# Patient Record
(demographics unavailable — no encounter records)

---

## 2025-01-05 NOTE — END OF VISIT
[] : Fellow [FreeTextEntry3] : Pt has extensive but small volume glendy disease and no other findings attributable to NHL. Reasonable to defer therapy at this time and repeat imaging in May as above. [id number] with pt that there is no survival benefit associated with early therapy, that the low but present risk of transformation to DLBCL is not affected by therapy and that there is a small probability - about 3% per yr - of having spontaneous regression of LAD. Her FL has a low proliferation index (10%-15%).

## 2025-01-05 NOTE — PHYSICAL EXAM
[Normal] : affect appropriate [Restricted in physically strenuous activity but ambulatory and able to carry out work of a light or sedentary nature] : Status 1- Restricted in physically strenuous activity but ambulatory and able to carry out work of a light or sedentary nature, e.g., light house work, office work [de-identified] : TTP RUQ [de-identified] : Bilateral LE edema L>R

## 2025-01-05 NOTE — ASSESSMENT
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 Washington. Patient has been advised by Dr. Larson to be treated with CD20+ monoclonal therapy. d/w pt that while therapy now could be considered, there is no survival advantage to being treated at this time. She does not meet ELF guidelines for treatment. Night sweats are likely chronic and not due to lymphoma.   PLAN: - Would recommend additional workup: LDH, uric acid, HIV, hepatitis panel, beta-2-microglobulin,    immunoelectrophoresis  if not already performed - Recommended repeat colonoscopy and EGD (to further workup food getting stuck when    swallowing) - Will obtain CT scans from [facility] on 9/9/24 - Would recommend surveillance rather than treatment with repeat CT c/a/p at 6 month isael in    May 2024 (6 months from the PET/CT performed 11/1/24)  Patient seen and d/w Dr. Radha Perez MD PGY5 Hematology/Oncology Pager: [phone]

## 2025-01-05 NOTE — PHYSICAL EXAM
[Normal] : affect appropriate [Restricted in physically strenuous activity but ambulatory and able to carry out work of a light or sedentary nature] : Status 1- Restricted in physically strenuous activity but ambulatory and able to carry out work of a light or sedentary nature, e.g., light house work, office work [de-identified] : TTP RUQ [de-identified] : Bilateral LE edema L>R

## 2025-01-05 NOTE — REVIEW OF SYSTEMS
[Diarrhea: Grade 0] : Diarrhea: Grade 0 [Negative] : Allergic/Immunologic [Abdominal Pain] : abdominal pain [FreeTextEntry2] : night sweats, hot flashes [FreeTextEntry7] : intermittent, food getting stuck and liquid [FreeTextEntry9] : back pain

## 2025-01-05 NOTE — END OF VISIT
[] : Fellow [FreeTextEntry3] : Pt has extensive but small volume glendy disease and no other findings attributable to NHL. Reasonable to defer therapy at this time and repeat imaging in May as above. [id number] with pt that there is no survival benefit associated with early therapy, that the low but present risk of transformation to DLBCL is not affected by therapy and that there is a small probability - about 3% per yr - of having spontaneous regression of LAD. Her FL has a low proliferation index (10%-15%).

## 2025-01-05 NOTE — HISTORY OF PRESENT ILLNESS
[Disease:__________________________] : Disease: [unfilled] [ECOG Performance Status: 0 - Fully active, able to carry on all pre-disease performance without restriction] : Performance Status: 0 - Fully active, able to carry on all pre-disease performance without restriction [de-identified] : 76y Female with hx of right breast cancer, presents with new diagnosis of Low grade (Grade 1/2) follicular lymphoma. Of note, she had hematuria for which a CT was performed and was found to have extensive mesenteric and retroperitoneal adenopathy as well as kidney stones. Now s/p CT guided needle biopsy of lymph node, left retroperitoneal periaortic node and s/p mesenteric lymph node biopsy. Patient presents for second opinion as to whether to begin treatment.  PMHx: diverticulitis, right breast cancer (dx , s/p lumpectomy and RT at [facility]), uterine fibroids, MVP, asthma, GERD, Obesity, DVT in 2019 above the knee prior to surgery, HTN PSHx: post breast lumpectomy (right), cataract removal, myomectomy, colon resection (for diverticulitis), left rotator cuff repair, total knee replacement (bilateral) Allergies: Levaquin Medications: aspirin 81 mg daily, valsartan-hydrochlorothiazide 320 mg - 12.5 mg, meloxicam 15 mg daily Social: denies smoking, social alcohol use. Lives with . Has 4 children, 5 grandchildren, and 1 great grandchildren. Retired 11 yrs ago from [employer]. FHx: maternal aunt with breast cancer, father  of leukemia at age 46. Daughter with breast cancer. [facility] cancer screenings: Last colonoscopy with EGD 8 yrs ago, last mammogram within the past year, cystoscopy normal for microscopic hematuria  SPEP (10/2024): no monoclonal band identified, Immunoglobulin panel normal CBC 24: WBC 7.2, Hgb 12.9,  CT c/a/p: at [facility] on 24 mackenzie-aortic and mesenteric. Largest 2.5 cm. New from 2016 prior CT. No other enlarged lymph nodes. 24: Pathology retroperitoneal lymph node: Follicular lymphoma, grade 1/2 out of 3. Ki67 10-15%.  An abnormal B-cell population identified (30% of lymphocytes, 27% of all cells) consistent with CD10+ B cell lymphoma Flow Cytometry: These abnormal B- cells are CD19+ (dim), CD20+, sigLambda+, CD5-, CD10+, CD38+, CD23-, and -. A smaller number of polytypic B-cells are identified (14% of lymphocytes). 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 Left para-aortic lymph node SUV 2.7, 1.7 x 1.0 cm. Midline lymph node SUV 3.8, 1.9 cm. No LAD in chest or elsewhere.  Hypercoagulable workup performed in 2019: negative for APLS, Protein C and S deficiency, ATIII deficiency, homocysteine deficiency.    [de-identified] : 1/2/25: Patient presents to establish care. She continues to have night sweats, hot flashes which she has had since menopause. Denies weight loss. She also has abdominal pain intermittently in RUQ and epigastric.  She often has this pain at night when goes to sleep every day, and it lasts for 5-10 minutes. She denies changes in bowel movement. Last colonoscopy a few tears ago. She has MSK problems with back pain, knee pain. She has palpitations more often prior. She had been evaluated and wore a cardiac monitor. She denies seizures or fainting. She denies no documented arrhythmia. She occasionally feels food getting stuck which has not yet been worked up, she had EGD about 8 years ago. CBC today: WBC 7.7, Hgb 13.9, , AMC 0.95.

## 2025-01-05 NOTE — HISTORY OF PRESENT ILLNESS
[Disease:__________________________] : Disease: [unfilled] [ECOG Performance Status: 0 - Fully active, able to carry on all pre-disease performance without restriction] : Performance Status: 0 - Fully active, able to carry on all pre-disease performance without restriction [de-identified] : 76y Female with hx of right breast cancer, presents with new diagnosis of Low grade (Grade 1/2) follicular lymphoma. Of note, she had hematuria for which a CT was performed and was found to have extensive mesenteric and retroperitoneal adenopathy as well as kidney stones. Now s/p CT guided needle biopsy of lymph node, left retroperitoneal periaortic node and s/p mesenteric lymph node biopsy. Patient presents for second opinion as to whether to begin treatment.  PMHx: diverticulitis, right breast cancer (dx , s/p lumpectomy and RT at [facility]), uterine fibroids, MVP, asthma, GERD, Obesity, DVT in 2019 above the knee prior to surgery, HTN PSHx: post breast lumpectomy (right), cataract removal, myomectomy, colon resection (for diverticulitis), left rotator cuff repair, total knee replacement (bilateral) Allergies: Levaquin Medications: aspirin 81 mg daily, valsartan-hydrochlorothiazide 320 mg - 12.5 mg, meloxicam 15 mg daily Social: denies smoking, social alcohol use. Lives with . Has 4 children, 5 grandchildren, and 1 great grandchildren. Retired 11 yrs ago from [employer]. FHx: maternal aunt with breast cancer, father  of leukemia at age 46. Daughter with breast cancer. [facility] cancer screenings: Last colonoscopy with EGD 8 yrs ago, last mammogram within the past year, cystoscopy normal for microscopic hematuria  SPEP (10/2024): no monoclonal band identified, Immunoglobulin panel normal CBC 24: WBC 7.2, Hgb 12.9,  CT c/a/p: at [facility] on 24 mackenzie-aortic and mesenteric. Largest 2.5 cm. New from 2016 prior CT. No other enlarged lymph nodes. 24: Pathology retroperitoneal lymph node: Follicular lymphoma, grade 1/2 out of 3. Ki67 10-15%.  An abnormal B-cell population identified (30% of lymphocytes, 27% of all cells) consistent with CD10+ B cell lymphoma Flow Cytometry: These abnormal B- cells are CD19+ (dim), CD20+, sigLambda+, CD5-, CD10+, CD38+, CD23-, and -. A smaller number of polytypic B-cells are identified (14% of lymphocytes). 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 Left para-aortic lymph node SUV 2.7, 1.7 x 1.0 cm. Midline lymph node SUV 3.8, 1.9 cm. No LAD in chest or elsewhere.  Hypercoagulable workup performed in 2019: negative for APLS, Protein C and S deficiency, ATIII deficiency, homocysteine deficiency.    [de-identified] : 1/2/25: Patient presents to establish care. She continues to have night sweats, hot flashes which she has had since menopause. Denies weight loss. She also has abdominal pain intermittently in RUQ and epigastric.  She often has this pain at night when goes to sleep every day, and it lasts for 5-10 minutes. She denies changes in bowel movement. Last colonoscopy a few tears ago. She has MSK problems with back pain, knee pain. She has palpitations more often prior. She had been evaluated and wore a cardiac monitor. She denies seizures or fainting. She denies no documented arrhythmia. She occasionally feels food getting stuck which has not yet been worked up, she had EGD about 8 years ago. CBC today: WBC 7.7, Hgb 13.9, , AMC 0.95.

## 2025-01-05 NOTE — PHYSICAL EXAM
[Normal] : affect appropriate [Restricted in physically strenuous activity but ambulatory and able to carry out work of a light or sedentary nature] : Status 1- Restricted in physically strenuous activity but ambulatory and able to carry out work of a light or sedentary nature, e.g., light house work, office work [de-identified] : TTP RUQ [de-identified] : Bilateral LE edema L>R

## 2025-01-05 NOTE — ASSESSMENT
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 Washington. Patient has been advised by Dr. Larson to be treated with CD20+ monoclonal therapy. d/w pt that while therapy now could be considered, there is no survival advantage to being treated at this time. She does not meet ELF guidelines for treatment. Night sweats are likely chronic and not due to lymphoma.   PLAN: - Would recommend additional workup: LDH, uric acid, HIV, hepatitis panel, beta-2-microglobulin,    immunoelectrophoresis  if not already performed - Recommended repeat colonoscopy and EGD (to further workup food getting stuck when    swallowing) - Will obtain CT scans from [facility] on 9/9/24 - Would recommend surveillance rather than treatment with repeat CT c/a/p at 6 month isael in    May 2024 (6 months from the PET/CT performed 11/1/24)  Patient seen and d/w Dr. Radha Perez MD PGY5 Hematology/Oncology Pager: [phone]

## 2025-01-05 NOTE — HISTORY OF PRESENT ILLNESS
[Disease:__________________________] : Disease: [unfilled] [ECOG Performance Status: 0 - Fully active, able to carry on all pre-disease performance without restriction] : Performance Status: 0 - Fully active, able to carry on all pre-disease performance without restriction [de-identified] : 76y Female with hx of right breast cancer, presents with new diagnosis of Low grade (Grade 1/2) follicular lymphoma. Of note, she had hematuria for which a CT was performed and was found to have extensive mesenteric and retroperitoneal adenopathy as well as kidney stones. Now s/p CT guided needle biopsy of lymph node, left retroperitoneal periaortic node and s/p mesenteric lymph node biopsy. Patient presents for second opinion as to whether to begin treatment.  PMHx: diverticulitis, right breast cancer (dx , s/p lumpectomy and RT at [facility]), uterine fibroids, MVP, asthma, GERD, Obesity, DVT in 2019 above the knee prior to surgery, HTN PSHx: post breast lumpectomy (right), cataract removal, myomectomy, colon resection (for diverticulitis), left rotator cuff repair, total knee replacement (bilateral) Allergies: Levaquin Medications: aspirin 81 mg daily, valsartan-hydrochlorothiazide 320 mg - 12.5 mg, meloxicam 15 mg daily Social: denies smoking, social alcohol use. Lives with . Has 4 children, 5 grandchildren, and 1 great grandchildren. Retired 11 yrs ago from [employer]. FHx: maternal aunt with breast cancer, father  of leukemia at age 46. Daughter with breast cancer. [facility] cancer screenings: Last colonoscopy with EGD 8 yrs ago, last mammogram within the past year, cystoscopy normal for microscopic hematuria  SPEP (10/2024): no monoclonal band identified, Immunoglobulin panel normal CBC 24: WBC 7.2, Hgb 12.9,  CT c/a/p: at [facility] on 24 mackenzie-aortic and mesenteric. Largest 2.5 cm. New from 2016 prior CT. No other enlarged lymph nodes. 24: Pathology retroperitoneal lymph node: Follicular lymphoma, grade 1/2 out of 3. Ki67 10-15%.  An abnormal B-cell population identified (30% of lymphocytes, 27% of all cells) consistent with CD10+ B cell lymphoma Flow Cytometry: These abnormal B- cells are CD19+ (dim), CD20+, sigLambda+, CD5-, CD10+, CD38+, CD23-, and -. A smaller number of polytypic B-cells are identified (14% of lymphocytes). 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 Left para-aortic lymph node SUV 2.7, 1.7 x 1.0 cm. Midline lymph node SUV 3.8, 1.9 cm. No LAD in chest or elsewhere.  Hypercoagulable workup performed in 2019: negative for APLS, Protein C and S deficiency, ATIII deficiency, homocysteine deficiency.    [de-identified] : 1/2/25: Patient presents to establish care. She continues to have night sweats, hot flashes which she has had since menopause. Denies weight loss. She also has abdominal pain intermittently in RUQ and epigastric.  She often has this pain at night when goes to sleep every day, and it lasts for 5-10 minutes. She denies changes in bowel movement. Last colonoscopy a few tears ago. She has MSK problems with back pain, knee pain. She has palpitations more often prior. She had been evaluated and wore a cardiac monitor. She denies seizures or fainting. She denies no documented arrhythmia. She occasionally feels food getting stuck which has not yet been worked up, she had EGD about 8 years ago. CBC today: WBC 7.7, Hgb 13.9, , AMC 0.95.

## 2025-01-05 NOTE — ASSESSMENT
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 Washington. Patient has been advised by Dr. Larson to be treated with CD20+ monoclonal therapy. d/w pt that while therapy now could be considered, there is no survival advantage to being treated at this time. She does not meet ELF guidelines for treatment. Night sweats are likely chronic and not due to lymphoma.   PLAN: - Would recommend additional workup: LDH, uric acid, HIV, hepatitis panel, beta-2-microglobulin,    immunoelectrophoresis  if not already performed - Recommended repeat colonoscopy and EGD (to further workup food getting stuck when    swallowing) - Will obtain CT scans from [facility] on 9/9/24 - Would recommend surveillance rather than treatment with repeat CT c/a/p at 6 month isael in    May 2024 (6 months from the PET/CT performed 11/1/24)  Patient seen and d/w Dr. Radha Perez MD PGY5 Hematology/Oncology Pager: [phone]

## 2025-06-10 NOTE — REASON FOR VISIT
[Other: ____] : [unfilled] [Initial Evaluation] : an initial evaluation of [Hypertension] : hypertension [FreeTextEntry1] : Mitral valve prolapse, preop examination

## 2025-06-10 NOTE — REVIEW OF SYSTEMS
[Dyspnea on exertion] : dyspnea during exertion [Chest Discomfort] : chest discomfort [Negative] : Heme/Lymph [SOB] : no shortness of breath [Lower Ext Edema] : no extremity edema [Leg Claudication] : no intermittent leg claudication [Palpitations] : no palpitations [Orthopnea] : no orthopnea [PND] : no PND [Syncope] : no syncope [FreeTextEntry5] : see HPI

## 2025-06-10 NOTE — PHYSICAL EXAM
[Well Developed] : well developed [Well Nourished] : well nourished [No Acute Distress] : no acute distress [Normal Venous Pressure] : normal venous pressure [Normal S1, S2] : normal S1, S2 [No Carotid Bruit] : no carotid bruit [No Rub] : no rub [Rhythm Regular] : regular [No Gallop] : no gallop [No Pitting Edema] : no pitting edema present [Clear Lung Fields] : clear lung fields [Good Air Entry] : good air entry [Soft] : abdomen soft [No Respiratory Distress] : no respiratory distress  [Non Tender] : non-tender [Normal Bowel Sounds] : normal bowel sounds [No Masses/organomegaly] : no masses/organomegaly [No Edema] : no edema [Normal Gait] : normal gait [No Cyanosis] : no cyanosis [No Clubbing] : no clubbing [No Rash] : no rash [No Varicosities] : no varicosities [Moves all extremities] : moves all extremities [No Skin Lesions] : no skin lesions [Normal Speech] : normal speech [No Focal Deficits] : no focal deficits [Alert and Oriented] : alert and oriented [Normal memory] : normal memory [General Appearance - Well Developed] : well developed [Normal Appearance] : normal appearance [Well Groomed] : well groomed [General Appearance - Well Nourished] : well nourished [General Appearance - In No Acute Distress] : no acute distress [No Deformities] : no deformities [Normal Oral Mucosa] : normal oral mucosa [Normal Conjunctiva] : the conjunctiva exhibited no abnormalities [Normal Jugular Venous A Waves Present] : normal jugular venous A waves present [No Jugular Venous Cline A Waves] : no jugular venous cline A waves [Normal Jugular Venous V Waves Present] : normal jugular venous V waves present [Not Palpable] : not palpable [Normal Rate] : normal [Normal S2] : normal S2 [Normal S1] : normal S1 [No Murmur] : no murmurs heard [2+] : left 2+ [No Abnormalities] : the abdominal aorta was not enlarged and no bruit was heard [1+] : left 1+ [___ +] : bilateral [unfilled]U+ pitting edema to the ankles [Respiration, Rhythm And Depth] : normal respiratory rhythm and effort [Auscultation Breath Sounds / Voice Sounds] : lungs were clear to auscultation bilaterally [Exaggerated Use Of Accessory Muscles For Inspiration] : no accessory muscle use [Abdomen Soft] : soft [Bowel Sounds] : normal bowel sounds [Abnormal Walk] : normal gait [Abdomen Tenderness] : non-tender [Gait - Sufficient For Exercise Testing] : the gait was sufficient for exercise testing [Nail Clubbing] : no clubbing of the fingernails [Cyanosis, Localized] : no localized cyanosis [Skin Color & Pigmentation] : normal skin color and pigmentation [Skin Turgor] : normal skin turgor [] : no rash [Oriented To Time, Place, And Person] : oriented to person, place, and time [Impaired Insight] : insight and judgment were intact [No Anxiety] : not feeling anxious [S3] : no S3 [S4] : no S4 [Right Carotid Bruit] : no bruit heard over the right carotid [Left Carotid Bruit] : no bruit heard over the left carotid [Right Femoral Bruit] : no bruit heard over the right femoral artery [Left Femoral Bruit] : no bruit heard over the left femoral artery

## 2025-06-10 NOTE — CARDIOLOGY SUMMARY
[___] : [unfilled] [Normal] : normal [de-identified] : sinus rhythm  [de-identified] : 2019\par  7 METS [de-identified] : 2019\par  LVEF 58%\par  mild MR\par  mod TR

## 2025-06-10 NOTE — DISCUSSION/SUMMARY
[FreeTextEntry1] : This is a 77 year old woman with a history of hypertension, a prior DVT no longer on AC who presents to the office for a cardiac evaluation.  She has been noting increased frequency of palpitations recently.  They occur at all times of the day.  She also had a right sided chest pain last week that radiated to the sternal area.    She is going to wear a two week Zio.  I am referring her for an echocardiogram to assess Her cardiac structure and function, as well as an exercise nuclear stress test to assess Her hemodynamic response to exercise, and to assess for the presence of obstructive coronary artery disease.  I am following up on her blood work, and checking to make sure a d dimer was sent.  I will call her with the results, and to discuss follow up.  [EKG obtained to assist in diagnosis and management of assessed problem(s)] : EKG obtained to assist in diagnosis and management of assessed problem(s)

## 2025-06-10 NOTE — HISTORY OF PRESENT ILLNESS
[FreeTextEntry1] : This is a 77 year old woman with a history of hypertension, a prior DVT no longer on AC who presents to the office for a cardiac evaluation.  She has been noting increased frequency of palpitations recently.  They occur at all times of the day.  She also had a right sided chest pain last week that radiated to the sternal area.  She thinks this is when the palpitations started.  She also feels a bit breathless with exertion.  No PND, orthopnea, LE swelling, dizziness, or syncope.   Her PMD did  blood work yesterday.  It is unclear if a d dimer was drawn.

## 2025-06-24 NOTE — CARDIOLOGY SUMMARY
[de-identified] : sinus rhythm.  low voltage.  [de-identified] : 2019\par  7 METS [de-identified] : 2019\par  LVEF 58%\par  mild MR\par  mod TR [___] : [unfilled] [Normal] : normal

## 2025-06-24 NOTE — DISCUSSION/SUMMARY
[FreeTextEntry1] : This is a 77 year old woman with a history of hypertension, a prior DVT no longer on AC who presents to the office for a cardiac evaluation.  She has been noting increased frequency of palpitations recently.  They occur at all times of the day.  She also had a right sided chest pain last week that radiated to the sternal area.  She thinks this is when the palpitations started.  She also feels a bit breathless with exertion.  I sent off a d dimer, and it was positive. She was sent for a CT PA, and she has bilateral segmental and subsegmental PEs.  No evidence of right heart strain on CT.  I performed a stat echocardiogram, and she has normal RV and LV function with no significant valvular heart disease.  She was sent to [place].  She was admitted for two days, given Lovenox, and discharged on full dose Eliquis.   Today is her last day of 10 BID of Eliquis.  She will start 5 bid tomorrow.  She will continue valsartan.  She has follow up with her PMD and with hematology.  She will see pulmonary.  I will see her back in three months.  She knows to call the office with any issues.  [EKG obtained to assist in diagnosis and management of assessed problem(s)] : EKG obtained to assist in diagnosis and management of assessed problem(s)

## 2025-06-24 NOTE — PHYSICAL EXAM
[Well Developed] : well developed [Well Nourished] : well nourished [No Acute Distress] : no acute distress [Normal Venous Pressure] : normal venous pressure [No Carotid Bruit] : no carotid bruit [Normal S1, S2] : normal S1, S2 [No Rub] : no rub [No Gallop] : no gallop [Rhythm Regular] : regular [No Pitting Edema] : no pitting edema present [Clear Lung Fields] : clear lung fields [Good Air Entry] : good air entry [No Respiratory Distress] : no respiratory distress  [Soft] : abdomen soft [Non Tender] : non-tender [No Masses/organomegaly] : no masses/organomegaly [Normal Bowel Sounds] : normal bowel sounds [Normal Gait] : normal gait [No Edema] : no edema [No Cyanosis] : no cyanosis [No Clubbing] : no clubbing [No Varicosities] : no varicosities [No Rash] : no rash [No Skin Lesions] : no skin lesions [Moves all extremities] : moves all extremities [No Focal Deficits] : no focal deficits [Normal Speech] : normal speech [Alert and Oriented] : alert and oriented [Normal memory] : normal memory [General Appearance - Well Developed] : well developed [Normal Appearance] : normal appearance [Well Groomed] : well groomed [General Appearance - Well Nourished] : well nourished [No Deformities] : no deformities [General Appearance - In No Acute Distress] : no acute distress [Normal Conjunctiva] : the conjunctiva exhibited no abnormalities [Normal Oral Mucosa] : normal oral mucosa [Normal Jugular Venous A Waves Present] : normal jugular venous A waves present [Normal Jugular Venous V Waves Present] : normal jugular venous V waves present [No Jugular Venous Cline A Waves] : no jugular venous cline A waves [Not Palpable] : not palpable [Normal Rate] : normal [Normal S1] : normal S1 [Normal S2] : normal S2 [S3] : no S3 [S4] : no S4 [No Murmur] : no murmurs heard [2+] : left 2+ [Right Carotid Bruit] : no bruit heard over the right carotid [Left Carotid Bruit] : no bruit heard over the left carotid [Right Femoral Bruit] : no bruit heard over the right femoral artery [Left Femoral Bruit] : no bruit heard over the left femoral artery [1+] : left 1+ [No Abnormalities] : the abdominal aorta was not enlarged and no bruit was heard [___ +] : bilateral [unfilled]U+ pitting edema to the ankles [Respiration, Rhythm And Depth] : normal respiratory rhythm and effort [Exaggerated Use Of Accessory Muscles For Inspiration] : no accessory muscle use [Auscultation Breath Sounds / Voice Sounds] : lungs were clear to auscultation bilaterally [Bowel Sounds] : normal bowel sounds [Abdomen Soft] : soft [Abdomen Tenderness] : non-tender [Abnormal Walk] : normal gait [Gait - Sufficient For Exercise Testing] : the gait was sufficient for exercise testing [Nail Clubbing] : no clubbing of the fingernails [Cyanosis, Localized] : no localized cyanosis [Skin Color & Pigmentation] : normal skin color and pigmentation [Skin Turgor] : normal skin turgor [] : no rash [Oriented To Time, Place, And Person] : oriented to person, place, and time [Impaired Insight] : insight and judgment were intact [No Anxiety] : not feeling anxious

## 2025-06-24 NOTE — REVIEW OF SYSTEMS
[SOB] : no shortness of breath [Dyspnea on exertion] : dyspnea during exertion [Chest Discomfort] : chest discomfort [Lower Ext Edema] : no extremity edema [Leg Claudication] : no intermittent leg claudication [Palpitations] : no palpitations [Orthopnea] : no orthopnea [PND] : no PND [Syncope] : no syncope [Negative] : Heme/Lymph [FreeTextEntry5] : see HPI

## 2025-06-24 NOTE — HISTORY OF PRESENT ILLNESS
[FreeTextEntry1] : This is a 77 year old woman with a history of hypertension, a prior DVT no longer on AC who presents to the office for a cardiac evaluation.  She has been noting increased frequency of palpitations recently.  They occur at all times of the day.  She also had a right sided chest pain last week that radiated to the sternal area.  She thinks this is when the palpitations started.  She also feels a bit breathless with exertion.  I sent off a d dimer, and it was positive. She was sent for a CT PA, and she has bilateral segmental and subsegmental PEs.  No evidence of right heart strain on CT.  I performed a stat echocardiogram, and she has normal RV and LV function with no significant valvular heart disease.  She was sent to [facility].  She was admitted for two days, given Lovenox, and discharged on full dose Eliquis.  No PND, orthopnea, LE swelling, dizziness, or syncope.